# Patient Record
Sex: FEMALE | Race: WHITE | Employment: STUDENT | URBAN - METROPOLITAN AREA
[De-identification: names, ages, dates, MRNs, and addresses within clinical notes are randomized per-mention and may not be internally consistent; named-entity substitution may affect disease eponyms.]

---
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
## 2024-01-14 ENCOUNTER — HOSPITAL ENCOUNTER (OUTPATIENT)
Age: 24
Discharge: HOME | End: 2024-01-14
Payer: COMMERCIAL

## 2024-01-14 VITALS
TEMPERATURE: 98 F | DIASTOLIC BLOOD PRESSURE: 95 MMHG | HEART RATE: 81 BPM | OXYGEN SATURATION: 98 % | SYSTOLIC BLOOD PRESSURE: 142 MMHG | RESPIRATION RATE: 18 BRPM

## 2024-01-14 DIAGNOSIS — S61.213A LACERATION OF LEFT MIDDLE FINGER WITHOUT FOREIGN BODY WITHOUT DAMAGE TO NAIL, INITIAL ENCOUNTER: Primary | ICD-10-CM

## 2024-01-14 PROCEDURE — 90471 IMMUNIZATION ADMIN: CPT

## 2024-01-14 PROCEDURE — 99203 OFFICE O/P NEW LOW 30 MIN: CPT

## 2024-01-14 RX ORDER — CEFADROXIL 500 MG/1
500 CAPSULE ORAL 2 TIMES DAILY
Qty: 14 CAPSULE | Refills: 0 | Status: SHIPPED | OUTPATIENT
Start: 2024-01-14 | End: 2024-01-21

## 2024-01-14 NOTE — DISCHARGE INSTRUCTIONS
Complete entire course of antibiotic as directed to prevent infection   Keep wound clean and dry. Gently clean wound daily with soap and water and apply Bacitracin ointment and new bandage   Keep wound covered when outside of home   Follow up with hand specialist and/or your primary care provider     Watch for signs of infection including redness, swelling, worsening pain, pus/drainage, fever. If you experience these or any other concerning symptoms, go to nearest ER immediately.

## 2024-01-14 NOTE — ED PROVIDER NOTES
Chief Complaint   Patient presents with    Laceration     History obtained from: patient   services declined     HPI:     Michelle Segovia is a 23 year old female who presents with laceration to left middle finger sustained 2 days ago. Patient states she was washing dishes when a kitchen knife slipped and cut the tip of her left middle finger. Patient has been able to control the bleeding at home. Denies joint pain, swelling, paraesthesias, weakness, drainage, change in skin color/temperature. Denies use of blood thinners. Tetanus immunization last updated in 2012.     PMH  No past medical history on file.    PFSH    PFS asessment screens reviewed and agree.  Nurses notes reviewed I agree with documentation.    No family history on file.  Family history reviewed with patient/caregiver and is not pertinent to presenting problem.  Social History     Socioeconomic History    Marital status: Single     Spouse name: Not on file    Number of children: Not on file    Years of education: Not on file    Highest education level: Not on file   Occupational History    Not on file   Tobacco Use    Smoking status: Never    Smokeless tobacco: Never   Vaping Use    Vaping Use: Never used   Substance and Sexual Activity    Alcohol use: No    Drug use: No    Sexual activity: Not on file   Other Topics Concern    Not on file   Social History Narrative    Not on file     Social Determinants of Health     Financial Resource Strain: Not on file   Food Insecurity: Not on file   Transportation Needs: Not on file   Physical Activity: Not on file   Stress: Not on file   Social Connections: Not on file   Housing Stability: Not on file         ROS:   Positive for stated complaint: laceration   All other systems reviewed and negative except as noted above.  Constitutional and Vital Signs Reviewed.    Physical Exam:     Findings:    BP (!) 142/95   Pulse 81   Temp 97.8 °F (36.6 °C) (Temporal)   Resp 18   LMP 12/15/2023  (Approximate)   SpO2 98%   GENERAL: well developed, no acute distress, non-toxic appearing   SKIN: good skin turgor, no obvious rashes  HEAD: normocephalic, atraumatic  EYES: sclera non-icteric bilaterally, conjunctiva clear  OROPHARYNX: airway patent  NECK: no nuchal rigidity, no trismus, phonation normal    CARDIO: regular rate, radial pulse 2+ bilaterally, cap refill < 2 sec   LUNGS: no increased WOB  EXTREMITIES: 1 cm linear superficial laceration to distal palmar aspect of left 3rd finger, no active bleeding, no gross contamination or drainage, no surrounding erythema, nail intact, no bony tenderness, no swelling or deformity, FROM, compartments soft, CMS intact   NEURO: no focal deficits  PSYCH: alert and oriented x3.  Answering questions appropriately.  Mood appropriate.    MDM/Assessment/Plan:   Orders for this encounter:    Orders Placed This Encounter    Apply dressing Telfa Once     Order Specific Question:   Dressing type     Answer:   Telfa    Tetanus-Diphth-Acell Pertussis (Tdap) (Boostrix) injection 0.5 mL    bacitracin 500 UNIT/GM ointment     Order Specific Question:   Please indicate site of application     Answer:   L upper extremity    cefadroxil 500 MG Oral Cap     Sig: Take 1 capsule (500 mg total) by mouth 2 (two) times daily for 7 days.     Dispense:  14 capsule     Refill:  0    bacitracin 500 UNIT/GM External Ointment     Sig: Apply 1 Application topically 2 (two) times daily for 7 days.     Dispense:  15 g     Refill:  0       Labs performed this visit:  No results found for this or any previous visit (from the past 10 hour(s)).    Imaging performed this visit:  No orders to display       MDM:  DDx includes laceration vs other. Patient is overall very well-appearing. No signs of infection. No signs of neurovascular compromise or compartment syndrome. No bony tenderness, swelling, or deformity. Given that laceration is > 24 hours old, primary closure contraindicated. Wound thoroughly  irrigated. Bacitracin, non-adherent dressing, and gauze wrap applied. Tetanus immunization updated. Rx cefadroxil for infection prophylaxis and bacitracin. Discussed supportive care and wound care. Instructed patient to go directly to nearest ER with any worsening or concerning symptoms. Follow up with hand specialist.     Diagnosis:    ICD-10-CM    1. Laceration of left middle finger without foreign body without damage to nail, initial encounter  S61.213A           All results reviewed and discussed with patient/patient's family. Patient/patient's family verbalize understanding of instructions. All of patient's/patient's family's questions were addressed.   See AVS for detailed discharge instructions for your condition today.    Follow Up with:  Simon Bartlett MD  [facility] [id number] [phone]      Hand specialist         Tami Joshua PA-C

## 2024-01-14 NOTE — ED INITIAL ASSESSMENT (HPI)
Patient with left middle finger tip laceration palm side after a knife slipped and cut her while washing dishes at home on Friday